# Patient Record
Sex: FEMALE | URBAN - METROPOLITAN AREA
[De-identification: names, ages, dates, MRNs, and addresses within clinical notes are randomized per-mention and may not be internally consistent; named-entity substitution may affect disease eponyms.]

---

## 2019-08-06 ENCOUNTER — OFFICE VISIT (OUTPATIENT)
Dept: URBAN - METROPOLITAN AREA CLINIC 62 | Facility: CLINIC | Age: 79
End: 2019-08-06
Payer: MEDICARE

## 2019-08-06 DIAGNOSIS — H25.813 COMBINED FORMS OF AGE-RELATED CATARACT, BILATERAL: Primary | Chronic | ICD-10-CM

## 2019-08-06 DIAGNOSIS — H52.4 PRESBYOPIA: Chronic | ICD-10-CM

## 2019-08-06 DIAGNOSIS — H01.8 OTHER SPECIFIED INFLAMMATIONS OF EYELID: Chronic | ICD-10-CM

## 2019-08-06 DIAGNOSIS — G51.4 FACIAL MYOKYMIA: Chronic | ICD-10-CM

## 2019-08-06 PROCEDURE — 92014 COMPRE OPH EXAM EST PT 1/>: CPT | Performed by: OPTOMETRIST

## 2019-08-06 PROCEDURE — 92002 INTRM OPH EXAM NEW PATIENT: CPT | Performed by: OPTOMETRIST

## 2019-08-06 ASSESSMENT — INTRAOCULAR PRESSURE
OS: 19
OD: 19

## 2019-08-06 ASSESSMENT — VISUAL ACUITY
OD: 20/20
OS: 20/30

## 2019-08-06 NOTE — IMPRESSION/PLAN
Impression: Combined forms of age-related cataract, bilateral: H25.813. Plan: Observe. Recommend updating glasses prescription at this time. No treatment currently recommended as cataracts do not affect the patients day to day life. Patient to monitor for vision changes and if vision significantly worsens, advised to RTC for evaluation.

## 2019-08-06 NOTE — IMPRESSION/PLAN
Impression: Other specified inflammations of eyelid: H01.8. OU. Plan: Blepharitis accounts for the patient's symptoms. Lid scrubs with diluted Nitesh Los and Ridge tear free baby shampoo as directed and monitor PRN.

## 2019-08-06 NOTE — IMPRESSION/PLAN
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 Discussed diagnosis with patient in detail and possible etiology. No treatment is required at this time. Will continue to observe condition and/or symptoms. Patient instructed to call if condition gets worse.

## 2020-08-18 ENCOUNTER — OFFICE VISIT (OUTPATIENT)
Dept: URBAN - METROPOLITAN AREA CLINIC 62 | Facility: CLINIC | Age: 80
End: 2020-08-18
Payer: MEDICARE

## 2020-08-18 DIAGNOSIS — H43.813 VITREOUS DEGENERATION, BILATERAL: ICD-10-CM

## 2020-08-18 PROCEDURE — 92014 COMPRE OPH EXAM EST PT 1/>: CPT | Performed by: OPTOMETRIST

## 2020-08-18 RX ORDER — PERPHENAZINE/AMITRIPTYLINE HCL 2 MG-10 MG
5 MG TABLET ORAL
Refills: 0 | Status: INACTIVE
Start: 2020-08-18 | End: 2021-11-03

## 2020-08-18 ASSESSMENT — VISUAL ACUITY
OS: 20/30
OD: 20/30

## 2020-08-18 ASSESSMENT — INTRAOCULAR PRESSURE
OS: 19
OD: 18

## 2020-08-18 NOTE — IMPRESSION/PLAN
Impression: Combined forms of age-related cataract, bilateral: H25.813. Plan: No treatment currently recommended, not affecting daily life, no complaints of blurry vision at this time. The patient will monitor vision changes and contact us with any decrease in vision.

## 2020-08-18 NOTE — IMPRESSION/PLAN
Impression: Dry eye syndrome of bilateral lacrimal glands: H04.123. Plan: Discussed diagnosis in detail with patient. Dry eye accounts for the patient's symptom. Cont. Systane Balance or Refresh Repair OU QID longterm. Will continue to observe condition and or symptoms.

## 2021-11-03 ENCOUNTER — OFFICE VISIT (OUTPATIENT)
Dept: URBAN - METROPOLITAN AREA CLINIC 63 | Facility: CLINIC | Age: 81
End: 2021-11-03
Payer: MEDICARE

## 2021-11-03 DIAGNOSIS — H04.123 DRY EYE SYNDROME OF BILATERAL LACRIMAL GLANDS: ICD-10-CM

## 2021-11-03 PROCEDURE — 99204 OFFICE O/P NEW MOD 45 MIN: CPT | Performed by: OPTOMETRIST

## 2021-11-03 ASSESSMENT — KERATOMETRY
OD: 45.00
OS: 45.00

## 2021-11-03 ASSESSMENT — INTRAOCULAR PRESSURE
OS: 22
OD: 22

## 2021-11-03 NOTE — IMPRESSION/PLAN
Impression: Combined forms of age-related cataract, bilateral: H25.813. Plan: Cataracts account for the patient's complaints. Discussed all risks, benefits, procedures and recovery. Patient understands changing glasses will not improve vision. Patient desires to have surgery and referred to Dr. Saulo Godinez for phacoemulsification with intraocular lens.